# Patient Record
Sex: MALE | Race: WHITE | Employment: OTHER | URBAN - METROPOLITAN AREA
[De-identification: names, ages, dates, MRNs, and addresses within clinical notes are randomized per-mention and may not be internally consistent; named-entity substitution may affect disease eponyms.]

---

## 2020-03-08 ENCOUNTER — HOSPITAL ENCOUNTER (OUTPATIENT)
Age: 75
Setting detail: OBSERVATION
Discharge: HOME | End: 2020-03-08
Attending: EMERGENCY MEDICINE
Payer: MEDICARE

## 2020-03-08 ENCOUNTER — APPOINTMENT (OUTPATIENT)
Dept: GENERAL RADIOLOGY | Age: 75
End: 2020-03-08
Payer: MEDICARE

## 2020-03-08 VITALS
DIASTOLIC BLOOD PRESSURE: 55 MMHG | RESPIRATION RATE: 17 BRPM | TEMPERATURE: 98.9 F | WEIGHT: 250 LBS | HEART RATE: 63 BPM | SYSTOLIC BLOOD PRESSURE: 122 MMHG | OXYGEN SATURATION: 98 % | BODY MASS INDEX: 33.86 KG/M2 | HEIGHT: 72 IN

## 2020-03-08 PROBLEM — T85.528A JEJUNOSTOMY TUBE FELL OUT: Status: ACTIVE | Noted: 2020-03-08

## 2020-03-08 LAB
ALBUMIN SERPL-MCNC: 3.2 G/DL (ref 3.5–5.2)
ALP BLD-CCNC: 176 U/L (ref 40–129)
ALT SERPL-CCNC: 18 U/L (ref 0–40)
ANION GAP SERPL CALCULATED.3IONS-SCNC: 15 MMOL/L (ref 7–16)
AST SERPL-CCNC: 29 U/L (ref 0–39)
BASOPHILS ABSOLUTE: 0.16 E9/L (ref 0–0.2)
BASOPHILS RELATIVE PERCENT: 1 % (ref 0–2)
BILIRUB SERPL-MCNC: 0.2 MG/DL (ref 0–1.2)
BUN BLDV-MCNC: 74 MG/DL (ref 8–23)
CALCIUM SERPL-MCNC: 9.5 MG/DL (ref 8.6–10.2)
CHLORIDE BLD-SCNC: 93 MMOL/L (ref 98–107)
CO2: 25 MMOL/L (ref 22–29)
CREAT SERPL-MCNC: 3.4 MG/DL (ref 0.7–1.2)
EOSINOPHILS ABSOLUTE: 0.28 E9/L (ref 0.05–0.5)
EOSINOPHILS RELATIVE PERCENT: 1.8 % (ref 0–6)
GFR AFRICAN AMERICAN: 21
GFR NON-AFRICAN AMERICAN: 18 ML/MIN/1.73
GLUCOSE BLD-MCNC: 69 MG/DL (ref 74–99)
HCT VFR BLD CALC: 31.3 % (ref 37–54)
HEMOGLOBIN: 9.5 G/DL (ref 12.5–16.5)
IMMATURE GRANULOCYTES #: 0.1 E9/L
IMMATURE GRANULOCYTES %: 0.7 % (ref 0–5)
LYMPHOCYTES ABSOLUTE: 0.95 E9/L (ref 1.5–4)
LYMPHOCYTES RELATIVE PERCENT: 6.2 % (ref 20–42)
MCH RBC QN AUTO: 26.8 PG (ref 26–35)
MCHC RBC AUTO-ENTMCNC: 30.4 % (ref 32–34.5)
MCV RBC AUTO: 88.2 FL (ref 80–99.9)
MONOCYTES ABSOLUTE: 1.24 E9/L (ref 0.1–0.95)
MONOCYTES RELATIVE PERCENT: 8.1 % (ref 2–12)
NEUTROPHILS ABSOLUTE: 12.61 E9/L (ref 1.8–7.3)
NEUTROPHILS RELATIVE PERCENT: 82.2 % (ref 43–80)
PDW BLD-RTO: 16.3 FL (ref 11.5–15)
PLATELET # BLD: 442 E9/L (ref 130–450)
PMV BLD AUTO: 9.7 FL (ref 7–12)
POTASSIUM SERPL-SCNC: 4.3 MMOL/L (ref 3.5–5)
RBC # BLD: 3.55 E12/L (ref 3.8–5.8)
SODIUM BLD-SCNC: 133 MMOL/L (ref 132–146)
TOTAL PROTEIN: 7.4 G/DL (ref 6.4–8.3)
WBC # BLD: 15.3 E9/L (ref 4.5–11.5)

## 2020-03-08 PROCEDURE — 43762 RPLC GTUBE NO REVJ TRC: CPT

## 2020-03-08 PROCEDURE — 80053 COMPREHEN METABOLIC PANEL: CPT

## 2020-03-08 PROCEDURE — 99283 EMERGENCY DEPT VISIT LOW MDM: CPT

## 2020-03-08 PROCEDURE — 85025 COMPLETE CBC W/AUTO DIFF WBC: CPT

## 2020-03-08 PROCEDURE — G0378 HOSPITAL OBSERVATION PER HR: HCPCS

## 2020-03-08 PROCEDURE — 6360000004 HC RX CONTRAST MEDICATION: Performed by: RADIOLOGY

## 2020-03-08 PROCEDURE — 74018 RADEX ABDOMEN 1 VIEW: CPT

## 2020-03-08 RX ADMIN — DIATRIZOATE MEGLUMINE AND DIATRIZOATE SODIUM 30 ML: 600; 100 SOLUTION ORAL; RECTAL at 14:13

## 2020-03-08 NOTE — CONSULTS
GENERAL SURGERY  CONSULT NOTE  3/8/2020    Physician Consulted: Dr. Neris Staley  Reason for Consult: G-J tube malfunction  Referring Physician: Dr. Meghna Franco    HPI  Kali Sears is a 76 y.o. 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 Patient is trached coming from [address] [place] [address], and there is no family at bedside therefore history was obtained via chart review. It would appear that patient was having issues with aspiration pneumonia therefore a G-J tube was placed with feeding through J tube and G tube to gravity, this appears to have been placed about a year ago. He has a history of prior GI bleed with erosions found along his jejunostomy tube. He does not appear to be on any blood thinning medications. Patient has been hemodynamically stable since admission, a PEG was placed to hold the tract. History reviewed. No pertinent past medical history. History reviewed. No pertinent surgical history. Medications Prior to Admission:    Prior to Admission medications    Not on File       No Known Allergies    History reviewed. No pertinent family history.     Social History     Tobacco Use    Smoking status: Never Smoker   Substance Use Topics    Alcohol use: Not Currently    Drug use: Not Currently         Review of Systems - Patient is trached and cannot talk or participate in ROS  General ROS: negative  Hematological and Lymphatic ROS: negative  Respiratory ROS: negative  Cardiovascular ROS: negative  Gastrointestinal ROS: negative  Genito-Urinary ROS: negative  Musculoskeletal ROS: negative      PHYSICAL EXAM:    Vitals:    03/08/20 1057   BP: (!) 130/92   Pulse: 75   Resp: 18   Temp: 98.9 °F (37.2 °C)   SpO2: 99%       General Appearance:  awake, alert, in no acute distress  Lungs:  Trach mask, no increased work of breathing  Heart:  RR  Abdomen:  Soft, NT, ND. PEG in place      LABS:    CBC  No

## 2020-03-10 RX ORDER — CHLORHEXIDINE GLUCONATE 0.12 MG/ML
15 RINSE ORAL 2 TIMES DAILY
COMMUNITY

## 2020-03-10 RX ORDER — SULFAMETHOXAZOLE AND TRIMETHOPRIM 80; 16 MG/ML; MG/ML
INJECTION INTRAVENOUS DAILY
COMMUNITY

## 2020-03-10 RX ORDER — PREGABALIN 50 MG/1
50 CAPSULE ORAL 2 TIMES DAILY
COMMUNITY

## 2020-03-10 RX ORDER — DEXTROSE MONOHYDRATE 25 G/50ML
25 INJECTION, SOLUTION INTRAVENOUS PRN
COMMUNITY

## 2020-03-10 RX ORDER — SODIUM PHOSPHATE,MONO-DIBASIC 19G-7G/118
500 ENEMA (ML) RECTAL 2 TIMES DAILY
COMMUNITY

## 2020-03-10 RX ORDER — BUPROPION HYDROCHLORIDE 100 MG/1
100 TABLET ORAL DAILY
COMMUNITY

## 2020-03-10 RX ORDER — INSULIN GLARGINE 100 [IU]/ML
24 INJECTION, SOLUTION SUBCUTANEOUS NIGHTLY
COMMUNITY

## 2020-03-10 RX ORDER — DARBEPOETIN ALFA 100 UG/.5ML
100 INJECTION, SOLUTION INTRAVENOUS; SUBCUTANEOUS WEEKLY
COMMUNITY

## 2020-03-10 RX ORDER — NICOTINE POLACRILEX 4 MG
15 LOZENGE BUCCAL SEE ADMIN INSTRUCTIONS
COMMUNITY

## 2020-03-10 RX ORDER — HEPARIN SODIUM 5000 [USP'U]/ML
5000 INJECTION, SOLUTION INTRAVENOUS; SUBCUTANEOUS EVERY 12 HOURS
COMMUNITY

## 2020-03-10 RX ORDER — ALBUTEROL SULFATE 2.5 MG/3ML
2.5 SOLUTION RESPIRATORY (INHALATION) 4 TIMES DAILY
COMMUNITY

## 2020-03-10 RX ORDER — POLYETHYLENE GLYCOL 3350 17 G/17G
17 POWDER, FOR SOLUTION ORAL DAILY
COMMUNITY

## 2020-03-10 RX ORDER — ONDANSETRON 4 MG/1
4 TABLET, FILM COATED ORAL EVERY 6 HOURS PRN
COMMUNITY

## 2020-03-10 NOTE — PROGRESS NOTES
Completed PAT call with Nestor Meier, RN at [facility] . States pt alert , oriented to name and pt health status. Bedrest and up to chair. No advance directives. Pt NOK is pt daughter Cheyenne Huffman . Pt has tracheostomy with #6 Shiley cuffed trach, trach is left uncuffed. Message left on  for respiratory with trach information and surgery date and arrival time. Pt in isolation for pseudomonas pneumonia. Has wounds RLE, LLE, Right heal and left heal dressed. Tesio right chest, hemodialysis mon, wed, fri; will be dialyzed 03/13/2020 afternoon.

## 2020-03-11 ENCOUNTER — PREP FOR PROCEDURE (OUTPATIENT)
Dept: SURGERY | Age: 75
End: 2020-03-11

## 2020-03-11 RX ORDER — SODIUM CHLORIDE 9 MG/ML
INJECTION, SOLUTION INTRAVENOUS CONTINUOUS
Status: CANCELLED | OUTPATIENT
Start: 2020-03-11

## 2020-03-12 NOTE — PROGRESS NOTES
Received call from Juany Chaudhari at [facility] [phone], states pt daughter and son to meet pt at [facility] 03/13/2020 1300 and will sign pt consents for surgery.

## 2020-03-13 ENCOUNTER — HOSPITAL ENCOUNTER (OUTPATIENT)
Age: 75
Setting detail: OUTPATIENT SURGERY
Discharge: NURSING, REHAB OR LONG-TERM CARE | End: 2020-03-13
Attending: SURGERY | Admitting: SURGERY
Payer: MEDICARE

## 2020-03-13 ENCOUNTER — ANESTHESIA EVENT (OUTPATIENT)
Dept: OPERATING ROOM | Age: 75
End: 2020-03-13
Payer: MEDICARE

## 2020-03-13 ENCOUNTER — ANESTHESIA (OUTPATIENT)
Dept: OPERATING ROOM | Age: 75
End: 2020-03-13
Payer: MEDICARE

## 2020-03-13 VITALS
OXYGEN SATURATION: 100 % | TEMPERATURE: 97.7 F | BODY MASS INDEX: 25.35 KG/M2 | DIASTOLIC BLOOD PRESSURE: 56 MMHG | HEART RATE: 73 BPM | RESPIRATION RATE: 20 BRPM | WEIGHT: 187.17 LBS | HEIGHT: 72 IN | SYSTOLIC BLOOD PRESSURE: 97 MMHG

## 2020-03-13 VITALS
OXYGEN SATURATION: 95 % | SYSTOLIC BLOOD PRESSURE: 101 MMHG | DIASTOLIC BLOOD PRESSURE: 49 MMHG | RESPIRATION RATE: 1 BRPM

## 2020-03-13 LAB
METER GLUCOSE: 101 MG/DL (ref 74–99)
METER GLUCOSE: 90 MG/DL (ref 74–99)
POTASSIUM SERPL-SCNC: 4.6 MMOL/L (ref 3.5–5)

## 2020-03-13 PROCEDURE — 82962 GLUCOSE BLOOD TEST: CPT

## 2020-03-13 PROCEDURE — 36415 COLL VENOUS BLD VENIPUNCTURE: CPT

## 2020-03-13 PROCEDURE — 3600007502: Performed by: SURGERY

## 2020-03-13 PROCEDURE — 2580000003 HC RX 258: Performed by: NURSE ANESTHETIST, CERTIFIED REGISTERED

## 2020-03-13 PROCEDURE — 3700000001 HC ADD 15 MINUTES (ANESTHESIA): Performed by: SURGERY

## 2020-03-13 PROCEDURE — 2709999900 HC NON-CHARGEABLE SUPPLY: Performed by: SURGERY

## 2020-03-13 PROCEDURE — 3600007512: Performed by: SURGERY

## 2020-03-13 PROCEDURE — 84132 ASSAY OF SERUM POTASSIUM: CPT

## 2020-03-13 PROCEDURE — 7100000010 HC PHASE II RECOVERY - FIRST 15 MIN: Performed by: SURGERY

## 2020-03-13 PROCEDURE — 2720000010 HC SURG SUPPLY STERILE: Performed by: SURGERY

## 2020-03-13 PROCEDURE — 3700000000 HC ANESTHESIA ATTENDED CARE: Performed by: SURGERY

## 2020-03-13 PROCEDURE — 7100000011 HC PHASE II RECOVERY - ADDTL 15 MIN: Performed by: SURGERY

## 2020-03-13 PROCEDURE — 6360000002 HC RX W HCPCS: Performed by: NURSE ANESTHETIST, CERTIFIED REGISTERED

## 2020-03-13 RX ORDER — SODIUM CHLORIDE 9 MG/ML
INJECTION, SOLUTION INTRAVENOUS CONTINUOUS PRN
Status: DISCONTINUED | OUTPATIENT
Start: 2020-03-13 | End: 2020-03-13 | Stop reason: SDUPTHER

## 2020-03-13 RX ORDER — SODIUM CHLORIDE 9 MG/ML
INJECTION, SOLUTION INTRAVENOUS CONTINUOUS
Status: DISCONTINUED | OUTPATIENT
Start: 2020-03-13 | End: 2020-03-13 | Stop reason: HOSPADM

## 2020-03-13 RX ORDER — OXYCODONE HYDROCHLORIDE 5 MG/1
5 TABLET ORAL EVERY 6 HOURS PRN
Qty: 12 TABLET | Refills: 0 | Status: SHIPPED | OUTPATIENT
Start: 2020-03-13 | End: 2020-03-16

## 2020-03-13 RX ORDER — PROPOFOL 10 MG/ML
INJECTION, EMULSION INTRAVENOUS PRN
Status: DISCONTINUED | OUTPATIENT
Start: 2020-03-13 | End: 2020-03-13 | Stop reason: SDUPTHER

## 2020-03-13 RX ADMIN — PHENYLEPHRINE HYDROCHLORIDE 100 MCG: 10 INJECTION INTRAVENOUS at 15:24

## 2020-03-13 RX ADMIN — SODIUM CHLORIDE: 9 INJECTION, SOLUTION INTRAVENOUS at 15:08

## 2020-03-13 RX ADMIN — PHENYLEPHRINE HYDROCHLORIDE 100 MCG: 10 INJECTION INTRAVENOUS at 15:13

## 2020-03-13 RX ADMIN — PROPOFOL 70 MG: 10 INJECTION, EMULSION INTRAVENOUS at 15:12

## 2020-03-13 RX ADMIN — PHENYLEPHRINE HYDROCHLORIDE 100 MCG: 10 INJECTION INTRAVENOUS at 15:16

## 2020-03-13 ASSESSMENT — PULMONARY FUNCTION TESTS
PIF_VALUE: 2
PIF_VALUE: 3
PIF_VALUE: 1
PIF_VALUE: 0
PIF_VALUE: 1
PIF_VALUE: 0
PIF_VALUE: 1
PIF_VALUE: 0
PIF_VALUE: 1

## 2020-03-13 NOTE — ANESTHESIA POSTPROCEDURE EVALUATION
Department of Anesthesiology  Postprocedure Note    Patient: Hany Connors  MRN: [id number]  YOB: 1945  Date of evaluation: 3/13/2020  Time:  5:01 PM     Procedure Summary     Date:  03/13/20 Room / Location:  [facility] OR 09 / [facility]    Anesthesia Start:  2737 Anesthesia Stop:  9322    Procedure:  EGD J TUBE PLACEMENT+++PT ON VENT,HAS TRACH, ISOL++VIBRA PT+++ (ENDO STAFF NEEDED) (N/A ) Diagnosis:  (MALNUTRITION)    Surgeon:  Haley Roque MD Responsible Provider:  Shaquille Marques MD    Anesthesia Type:  MAC ASA Status:  4          Anesthesia Type: MAC    Cesario Phase I: Cesario Score: 9    Cesario Phase II: Cesario Score: 9    Last vitals: Reviewed and per EMR flowsheets.        Anesthesia Post Evaluation    Patient location during evaluation: PACU  Patient participation: complete - patient participated  Level of consciousness: awake and alert  Airway patency: patent  Nausea & Vomiting: no vomiting and no nausea  Complications: no  Cardiovascular status: blood pressure returned to baseline  Respiratory status: acceptable  Hydration status: euvolemic

## 2020-03-13 NOTE — OP NOTE
[address]      DATE OF PROCEDURE: 3/13/2020    SURGEON: Dr. Fang Favors: Jian Mckeon: Dysphagia requiring gastrostomy tube, dislodged PEG tube    POSTOPERATIVE DIAGNOSES: Same     OPERATION:  EGD with PEG-J tube placement     ANESTHESIA: LMAC    CONSENT AND INDICATIONS:  This is a 76y.o. year old male whose PEG-J tube became dislodged. He was seen in the ED and a replacement PEG was placed until a PEG-J could be placed. I have discussed with the patient and/or the patient representative the indication, alternatives, and the possible risks and/or complications of the planned procedure and the anesthesia methods. The patient and/or patient representative appear to understand and agree to proceed. Complications: none    OPERATIONS: The patient was placed on the table and sedated via LMAC. Bite block was placed. A lubricated scope was easily passed into the upper esophagus which looked normal. The distal esophagus looked normal. The scope was passed into the stomach and the replacement PEG was noted in the stomach. The scope was passed down toward the pylorus. The antral mucosa all looked normal. The scope was then passed through the pylorus into the duodenal bulb which looked normal, then around to the distal duodenum which looked normal. The replacement PEG balloon was deflated, and a one piece PEG-J tube was placed into the stomach. A suture was tied to the distal aspect of the J tube portion, grasped with an endoscopic clip, pulled into the distal duodenum and clipped to the mucosa. The stomach was desufflated and the scope removed. The patient tolerated the procedure well.   Dr. Arslan Natarajan was present throughout    Electronically signed by Angelina Martin DO on 3/13/2020 at 4:52 PM

## 2020-03-13 NOTE — FLOWSHEET NOTE
Spoke with Brunilda Edmondson at [address] to return transportation to [facility]. Aproximate ETA is 1 hour. She stated she will call back with a more accurate time of .

## 2020-03-13 NOTE — ANESTHESIA PRE PROCEDURE
uncuffed. Past Surgical History:        Procedure Laterality Date    GASTROSTOMY TUBE PLACEMENT      TRACHEOSTOMY         Social History:    Social History     Tobacco Use    Smoking status: Never Smoker   Substance Use Topics    Alcohol use: Not Currently                                Counseling given: Not Answered      Vital Signs (Current):   Vitals:    03/10/20 1211   Weight: 187 lb 2.7 oz (84.9 kg)                                              BP Readings from Last 3 Encounters:   03/08/20 (!) 122/55       NPO Status:                                                                                 BMI:   Wt Readings from Last 3 Encounters:   03/10/20 187 lb 2.7 oz (84.9 kg)   03/08/20 250 lb (113.4 kg)     Body mass index is 25.38 kg/m². CBC:   Lab Results   Component Value Date    WBC 15.3 03/08/2020    RBC 3.55 03/08/2020    HGB 9.5 03/08/2020    HCT 31.3 03/08/2020    MCV 88.2 03/08/2020    RDW 16.3 03/08/2020     03/08/2020       CMP:   Lab Results   Component Value Date     03/08/2020    K 4.3 03/08/2020    CL 93 03/08/2020    CO2 25 03/08/2020    BUN 74 03/08/2020    CREATININE 3.4 03/08/2020    GFRAA 21 03/08/2020    LABGLOM 18 03/08/2020    GLUCOSE 69 03/08/2020    PROT 7.4 03/08/2020    CALCIUM 9.5 03/08/2020    BILITOT 0.2 03/08/2020    ALKPHOS 176 03/08/2020    AST 29 03/08/2020    ALT 18 03/08/2020       POC Tests: No results for input(s): POCGLU, POCNA, POCK, POCCL, POCBUN, POCHEMO, POCHCT in the last 72 hours.     Coags: No results found for: PROTIME, INR, APTT    HCG (If Applicable): No results found for: PREGTESTUR, PREGSERUM, HCG, HCGQUANT     ABGs: No results found for: PHART, PO2ART, KGA5LPO, MHB1VTF, BEART, D2JZGCAD     Type & Screen (If Applicable):  No results found for: LABABO, LABRH    Anesthesia Evaluation   no history of anesthetic complications:   Airway: Mallampati: III  TM distance: >3 FB   Neck ROM: full  Mouth opening: > = 3 FB Dental: normal exam

## (undated) DEVICE — SUTURE PROL SZ 2-0 L30IN NONABSORBABLE BLU L26MM SH 1/2 CIR 8833H

## (undated) DEVICE — GAUZE,SPONGE,4"X4",8PLY,STRL,LF,10/TRAY: Brand: MEDLINE

## (undated) DEVICE — STRIP,CLOSURE,WOUND,MEDI-STRIP,1/2X4: Brand: MEDLINE

## (undated) DEVICE — GRADUATE TRIANG MEASURE 1000ML BLK PRNT

## (undated) DEVICE — COVER HNDL LT DISP

## (undated) DEVICE — TUBE GASTSTMY 20FR BAL 7-10ML SIL SECURLOK EXT RETEN RNG

## (undated) DEVICE — PACK PROCEDURE SURG GEN CUST

## (undated) DEVICE — NEEDLE HYPO 22GA L1.5IN BLK POLYPR HUB S STL REG BVL STR

## (undated) DEVICE — ABSORBENT, WATERPROOF, BACTERIA PROOF FILM DRESSING: Brand: OPSITE POST OP 15.5X8.5CM CTN 20

## (undated) DEVICE — ABSORBENT, WATERPROOF, BACTERIA PROOF FILM DRESSING: Brand: OPSITE POST OP 20X10CM CTN 20

## (undated) DEVICE — WORKING LENGTH 155CM, WORKING CHANNEL 2.8MM: Brand: RESOLUTION 360 CLIP

## (undated) DEVICE — DRESSING TRNSPAR W4XL4.75IN FLM FLEXIGRID

## (undated) DEVICE — SYRINGE MED 10ML TRNSLUC BRL PLUNG BLK MRK POLYPR CTRL

## (undated) DEVICE — ELECTRODE PT RET AD L9FT HI MOIST COND ADH HYDRGEL CORDED

## (undated) DEVICE — TUBING SUCT 12FR MAL ALUM SHFT FN CAP VENT UNIV CONN W/ OBT

## (undated) DEVICE — TAPE,WATERPROOF,CURAD,2"X10YD,LF,72/CS: Brand: CURAD

## (undated) DEVICE — DRAPE,CHEST,FENES,15X10,STERIL: Brand: MEDLINE

## (undated) DEVICE — ADHESIVE SKIN CLSR 0.7ML TOP DERMBND ADV

## (undated) DEVICE — APPLICATOR PREP 26ML 0.7% IOD POVACRYLEX 74% ISO ALC ST

## (undated) DEVICE — INTENDED FOR TISSUE SEPARATION, AND OTHER PROCEDURES THAT REQUIRE A SHARP SURGICAL BLADE TO PUNCTURE OR CUT.: Brand: BARD-PARKER ® STAINLESS STEEL BLADES

## (undated) DEVICE — 4-PORT MANIFOLD: Brand: NEPTUNE 2